# Patient Record
Sex: FEMALE | Race: BLACK OR AFRICAN AMERICAN
[De-identification: names, ages, dates, MRNs, and addresses within clinical notes are randomized per-mention and may not be internally consistent; named-entity substitution may affect disease eponyms.]

---

## 2017-05-08 ENCOUNTER — HOSPITAL ENCOUNTER (EMERGENCY)
Dept: HOSPITAL 17 - PHED | Age: 32
Discharge: HOME | End: 2017-05-08
Payer: SELF-PAY

## 2017-05-08 VITALS
OXYGEN SATURATION: 98 % | SYSTOLIC BLOOD PRESSURE: 159 MMHG | DIASTOLIC BLOOD PRESSURE: 123 MMHG | HEART RATE: 74 BPM | TEMPERATURE: 98.6 F | RESPIRATION RATE: 16 BRPM

## 2017-05-08 VITALS — DIASTOLIC BLOOD PRESSURE: 99 MMHG | SYSTOLIC BLOOD PRESSURE: 152 MMHG

## 2017-05-08 VITALS — HEIGHT: 66 IN | BODY MASS INDEX: 42.73 KG/M2 | WEIGHT: 265.88 LBS

## 2017-05-08 DIAGNOSIS — N64.4: Primary | ICD-10-CM

## 2017-05-08 PROCEDURE — 99283 EMERGENCY DEPT VISIT LOW MDM: CPT

## 2017-05-08 NOTE — PD
HPI


Chief Complaint:  Pain: Acute or Chronic


Time Seen by Provider:  10:24


Travel History


International Travel<30 days:  No


Contact w/Intl Traveler<30days:  No


Traveled to known affect area:  No





History of Present Illness


HPI


The patient was seen and examined in the presence of the nurse.  She complains 

of periodic sharp pains in her right breast and noticed that last night she had 

some blood leakage from her nipple.  She had breast reduction surgery 4 months 

ago in [place].  Has not had any complication with that.  Denies fever or injury.

  Symptoms severity is mild





PFSH


Past Medical History


Medical History:  Denies Significant Hx


Blood Disorders:  No


Cancer:  No


Cardiovascular Problems:  No


Chemotherapy:  No


Diminished Hearing:  No


Endocrine:  No


Gastrointestinal Disorders:  No


Genitourinary:  No


Immune Disorder:  No


Musculoskeletal:  No


Neurologic:  No


Psychiatric:  No


Respiratory:  No


Radiation Therapy:  No


Tetanus Vaccination:  < 5 Years


Influenza Vaccination:  No


Pregnant?:  Not Pregnant


LMP:  ~ 1 month ago


:  5


Para:  4


:  1





Past Surgical History


AICD:  No


Hysterectomy:  No


Joint Replacement:  No


Pacemaker:  No


Other Surgery:  Yes (BL breast reduction )





Social History


Alcohol Use:  No


Tobacco Use:  No


Substance Use:  No





Allergies-Medications


(Allergen,Severity, Reaction):  


Coded Allergies:  


     No Known Allergies (Verified , 17)


Reported Meds & Prescriptions





Reported Meds & Active Scripts


Active


No Active Prescriptions or Reported Medications    








Review of Systems


General / Constitutional:  No: Fever


HENT:  No: Headaches


Cardiovascular:  No: Chest Pain or Discomfort


Respiratory:  No: Cough





Physical Exam


Narrative


SKIN: Focused skin assessment reveals no rash or ulcers. Skin is warm and dry.  

Palpation shows no induration or nodules.





Psych: Normal mood and affect.  Normal insight and judgment.





Right breast: Has an underlying diffuse lumpy pattern.  I don't feel a dominant 

mass.  No sign of infection or dehiscence





Data


Data


Last Documented VS





Vital Signs








  Date Time  Temp Pulse Resp B/P Pulse Ox O2 Delivery O2 Flow Rate FiO2


 


17 10:04 98.6 74 16 159/123 98   











MDM


Medical Decision Making


Medical Screen Exam Complete:  Yes


Emergency Medical Condition:  Yes


Medical Record Reviewed:  Yes


Differential Diagnosis


Cyst, tumor, nodule


Narrative Course


I have reviewed the patient's electronic medical record.





Recommended she call her primary physician Dr. Tanner for follow-up and have 

him examine her breasts and decide if she needs any imaging or other workup.





Diagnosis





 Primary Impression:  


 Breast pain, right


 Additional Impression:  


 Bleeding from nipple in female





***Additional Instructions:


The patient was advised to follow up with their physician and return if they 

worsen.


***Med/Other Pt SpecificInfo:  Other


Scripts


No Active Prescriptions or Reported Meds


Disposition:  01 DISCHARGE HOME


Condition:  Stable








Ramos Menjivar MD May 8, 2017 10:39

## 2018-01-06 ENCOUNTER — HOSPITAL ENCOUNTER (EMERGENCY)
Dept: HOSPITAL 17 - NEPD | Age: 33
Discharge: HOME | End: 2018-01-06
Payer: COMMERCIAL

## 2018-01-06 VITALS
TEMPERATURE: 98.2 F | OXYGEN SATURATION: 95 % | SYSTOLIC BLOOD PRESSURE: 157 MMHG | DIASTOLIC BLOOD PRESSURE: 103 MMHG | RESPIRATION RATE: 14 BRPM | HEART RATE: 80 BPM

## 2018-01-06 DIAGNOSIS — I10: ICD-10-CM

## 2018-01-06 DIAGNOSIS — R10.2: Primary | ICD-10-CM

## 2018-01-06 DIAGNOSIS — D64.9: ICD-10-CM

## 2018-01-06 DIAGNOSIS — Z79.899: ICD-10-CM

## 2018-01-06 LAB
BASOPHILS # BLD AUTO: 0.1 TH/MM3 (ref 0–0.2)
BASOPHILS NFR BLD: 1.8 % (ref 0–2)
EOSINOPHIL # BLD: 0.3 TH/MM3 (ref 0–0.4)
EOSINOPHIL NFR BLD: 4.9 % (ref 0–4)
ERYTHROCYTE [DISTWIDTH] IN BLOOD BY AUTOMATED COUNT: 18.2 % (ref 11.6–17.2)
HCT VFR BLD CALC: 32.3 % (ref 35–46)
HGB BLD-MCNC: 11.1 GM/DL (ref 11.6–15.3)
LYMPHOCYTES # BLD AUTO: 1.6 TH/MM3 (ref 1–4.8)
LYMPHOCYTES NFR BLD AUTO: 29 % (ref 9–44)
MCH RBC QN AUTO: 24.3 PG (ref 27–34)
MCHC RBC AUTO-ENTMCNC: 34.2 % (ref 32–36)
MCV RBC AUTO: 71.1 FL (ref 80–100)
MONOCYTE #: 0.6 TH/MM3 (ref 0–0.9)
MONOCYTES NFR BLD: 10.3 % (ref 0–8)
NEUTROPHILS # BLD AUTO: 2.9 TH/MM3 (ref 1.8–7.7)
NEUTROPHILS NFR BLD AUTO: 54 % (ref 16–70)
PLATELET # BLD: 380 TH/MM3 (ref 150–450)
PMV BLD AUTO: 7.7 FL (ref 7–11)
RBC # BLD AUTO: 4.55 MIL/MM3 (ref 4–5.3)
WBC # BLD AUTO: 5.4 TH/MM3 (ref 4–11)

## 2018-01-06 PROCEDURE — 84702 CHORIONIC GONADOTROPIN TEST: CPT

## 2018-01-06 PROCEDURE — 99284 EMERGENCY DEPT VISIT MOD MDM: CPT

## 2018-01-06 PROCEDURE — 85025 COMPLETE CBC W/AUTO DIFF WBC: CPT

## 2018-01-06 PROCEDURE — 96375 TX/PRO/DX INJ NEW DRUG ADDON: CPT

## 2018-01-06 PROCEDURE — 96374 THER/PROPH/DIAG INJ IV PUSH: CPT

## 2018-01-06 NOTE — PD
HPI


Chief Complaint:  GI Complaint


Time Seen by Provider:  10:48


Travel History


International Travel<30 days:  No


Contact w/Intl Traveler<30days:  No


Traveled to known affect area:  No





History of Present Illness


HPI


The patient was seen and examined in the presence of the nurse.  This patient 

complains of pelvic cramping and vaginal spotting.  She's had this since 

November.  She says  she had a miscarriage.  She was seen in [place] 

emergency room.  She had lab studies and ultrasound.  She is followed up with 

her primary physician in the meantime.  She's had some repeat blood work 2 days 

ago had a beta titer of 140.  She is basically had spotting for 2 months now.  

She has an appointment with an OB/GYN in 10 days.  She's not been sexually 

active since the miscarriage.  Duration 2 months.  Severity is moderate.  No 

alleviating factors.  No Exacerbating factors.





PFSH


Past Medical History


Blood Disorders:  No


Cancer:  No


Cardiovascular Problems:  No


Chemotherapy:  No


Diminished Hearing:  No


Endocrine:  No


Gastrointestinal Disorders:  No


Genitourinary:  No


Hypertension:  Yes


Immune Disorder:  No


Musculoskeletal:  No


Neurologic:  No


Psychiatric:  No


Respiratory:  No


Immunizations Current:  Yes


Radiation Therapy:  No


Pregnant?:  Unknown


:  5


Para:  4


:  1





Past Surgical History


AICD:  No


Hysterectomy:  No


Joint Replacement:  No


Pacemaker:  No


Other Surgery:  Yes (BL breast reduction )





Social History


Alcohol Use:  Yes (OCC)


Tobacco Use:  No


Substance Use:  Yes (MARIJUANA)





Allergies-Medications


(Allergen,Severity, Reaction):  


Coded Allergies:  


     No Known Allergies (Verified , 17)


Reported Meds & Prescriptions





Reported Meds & Active Scripts


Active


Tramadol (Tramadol HCl) 50 Mg Tab 50 Mg PO Q6H PRN


Reported


Amlodipine (Amlodipine Besylate) 5 Mg Tab 5 Mg PO DAILY








Review of Systems


General / Constitutional:  No: Fever


Eyes:  No: Visual changes


HENT:  No: Headaches


Cardiovascular:  No: Chest Pain or Discomfort


Respiratory:  No: Shortness of Breath


Gastrointestinal:  No: Abdominal Pain


Genitourinary:  Positive: Pelvic Pain, Vaginal Bleeding, No: Dysuria


Musculoskeletal:  No: Pain


Skin:  No Rash


Neurologic:  No: Weakness


Psychiatric:  No: Depression


Endocrine:  No: Polydipsia


Hematologic/Lymphatic:  No: Easy Bruising





Physical Exam


Narrative


GENERAL: Well-nourished, well-developed patient in no apparent distress.


SKIN: Focused skin assessment reveals no rash and nodules. Skin is Warm and dry.


HEAD: Atraumatic. Normocephalic. 


EYES: Pupils equal and round. No scleral icterus. No injection or drainage. 


ENT: No nasal bleeding or discharge.  Mucous membranes pink and moist.


NECK: Trachea midline. No JVD. 


CARDIOVASCULAR: Regular rate and rhythm.  No murmur appreciated.


RESPIRATORY: No accessory muscle use. Clear to auscultation. Breath sounds 

equal bilaterally. 


GASTROINTESTINAL: Abdomen soft, mild bilateral lower quadrant tenderness 

without rebound or guarding, nondistended. Hepatic and splenic margins not 

palpable. 


MUSCULOSKELETAL: No obvious deformities. No clubbing.  No cyanosis.  No edema. 


NEUROLOGICAL: Awake and alert. No obvious cranial nerve deficits.  Motor 

grossly within normal limits. Normal speech.


PSYCHIATRIC: Appropriate mood and affect; insight and judgment normal.





Data


Data


Last Documented VS





Vital Signs








  Date Time  Temp Pulse Resp B/P (MAP) Pulse Ox O2 Delivery O2 Flow Rate FiO2


 


18 10:11 98.2 80 14 157/103 (121) 95   








Orders





 Orders


Beta Hcg (Quant/Titer) (18 10:59)


Complete Blood Count With Diff (18 10:59)


Iv Access Insert/Monitor (18 10:59)


Ondansetron Inj (Zofran Inj) (18 11:45)


Morphine Inj (Morphine Inj) (18 11:45)


Morphine Inj (Morphine Inj) (18 11:45)





Labs





Laboratory Tests








Test


  18


11:08


 


White Blood Count 5.4 TH/MM3 


 


Red Blood Count 4.55 MIL/MM3 


 


Hemoglobin 11.1 GM/DL 


 


Hematocrit 32.3 % 


 


Mean Corpuscular Volume 71.1 FL 


 


Mean Corpuscular Hemoglobin 24.3 PG 


 


Mean Corpuscular Hemoglobin


Concent 34.2 % 


 


 


Red Cell Distribution Width 18.2 % 


 


Platelet Count 380 TH/MM3 


 


Mean Platelet Volume 7.7 FL 


 


Neutrophils (%) (Auto) 54.0 % 


 


Lymphocytes (%) (Auto) 29.0 % 


 


Monocytes (%) (Auto) 10.3 % 


 


Eosinophils (%) (Auto) 4.9 % 


 


Basophils (%) (Auto) 1.8 % 


 


Neutrophils # (Auto) 2.9 TH/MM3 


 


Lymphocytes # (Auto) 1.6 TH/MM3 


 


Monocytes # (Auto) 0.6 TH/MM3 


 


Eosinophils # (Auto) 0.3 TH/MM3 


 


Basophils # (Auto) 0.1 TH/MM3 


 


CBC Comment DIFF FINAL 


 


Differential Comment  


 


Human Chorionic Gonadotropin,


Quant 40 MIU/ML 


 











MDM


Medical Decision Making


Medical Screen Exam Complete:  Yes


Emergency Medical Condition:  Yes


Medical Record Reviewed:  Yes


Differential Diagnosis


Dysfunction uterine bleeding, ectopic, retained fetal tissue, miscarriage


Narrative Course


I have reviewed the patient's electronic medical record.





IV placed


CBC shows mild anemia


Beta hCG is 40,, which is coming down and almost back to 0





Looks clinically well.  Stable for outpatient GYN follow-up


I wrote some tramadol


Gave her dose of morphine here for symptom relief





Diagnosis





 Primary Impression:  


 Pelvic pain in female


 Additional Impression:  


 Miscarriage





***Additional Instructions:  


The patient was advised to follow up with their physician and return if they 

worsen.


The patient was warned about potential sedation for the medications they will 

receive on prescription.


***Med/Other Pt SpecificInfo:  Prescription(s) given


Scripts


Tramadol (Tramadol) 50 Mg Tab


50 MG PO Q6H Y for PAIN, #20 TAB 0 Refills


   Prov: Ramos Menjivar MD         18


Disposition:  01 DISCHARGE HOME


Condition:  Stable











Ramos Menjivar MD 2018 11:08